# Patient Record
Sex: FEMALE | Race: OTHER | Employment: FULL TIME | URBAN - NONMETROPOLITAN AREA
[De-identification: names, ages, dates, MRNs, and addresses within clinical notes are randomized per-mention and may not be internally consistent; named-entity substitution may affect disease eponyms.]

---

## 2018-01-10 ENCOUNTER — OFFICE VISIT (OUTPATIENT)
Dept: PRIMARY CARE CLINIC | Age: 33
End: 2018-01-10
Payer: MEDICARE

## 2018-01-10 VITALS
BODY MASS INDEX: 27.49 KG/M2 | WEIGHT: 161 LBS | HEIGHT: 64 IN | TEMPERATURE: 102.5 F | HEART RATE: 104 BPM | DIASTOLIC BLOOD PRESSURE: 70 MMHG | SYSTOLIC BLOOD PRESSURE: 120 MMHG | OXYGEN SATURATION: 98 %

## 2018-01-10 DIAGNOSIS — J10.1 INFLUENZA A: Primary | ICD-10-CM

## 2018-01-10 DIAGNOSIS — R50.9 FEVER, UNSPECIFIED FEVER CAUSE: ICD-10-CM

## 2018-01-10 LAB
INFLUENZA A ANTIBODY: POSITIVE
INFLUENZA B ANTIBODY: NEGATIVE

## 2018-01-10 PROCEDURE — 99213 OFFICE O/P EST LOW 20 MIN: CPT | Performed by: NURSE PRACTITIONER

## 2018-01-10 PROCEDURE — 87804 INFLUENZA ASSAY W/OPTIC: CPT | Performed by: NURSE PRACTITIONER

## 2018-01-10 PROCEDURE — G8484 FLU IMMUNIZE NO ADMIN: HCPCS | Performed by: NURSE PRACTITIONER

## 2018-01-10 PROCEDURE — G8427 DOCREV CUR MEDS BY ELIG CLIN: HCPCS | Performed by: NURSE PRACTITIONER

## 2018-01-10 PROCEDURE — 1036F TOBACCO NON-USER: CPT | Performed by: NURSE PRACTITIONER

## 2018-01-10 PROCEDURE — G8419 CALC BMI OUT NRM PARAM NOF/U: HCPCS | Performed by: NURSE PRACTITIONER

## 2018-01-10 ASSESSMENT — ENCOUNTER SYMPTOMS
RHINORRHEA: 1
WHEEZING: 0
VOMITING: 0
SINUS PRESSURE: 0
ABDOMINAL PAIN: 0
SORE THROAT: 1
NAUSEA: 0
COUGH: 1
DIARRHEA: 0
SHORTNESS OF BREATH: 0

## 2018-01-10 NOTE — PROGRESS NOTES
[facility]             [address] [facility], [place], [address]                        Telephone [phone]             Fax [phone]     Aakash Carmichael  1985  MRN:  [id number]   Date of visit:  1/10/2018    Subjective:    Aakash Carmichael is a 34 y.o.  female who presents to [facility] today (1/10/2018) for evaluation of:    Chief Complaint   Patient presents with    Fever     x1 day, bilateral ear pain, decreased appetite, cough, sore throat, HA       Fever    This is a new problem. The current episode started today. The problem occurs intermittently. The problem has been unchanged. Her temperature was unmeasured prior to arrival. Associated symptoms include congestion, coughing, ear pain (bilateral), headaches, muscle aches and a sore throat. Pertinent negatives include no abdominal pain, diarrhea, nausea, rash, vomiting or wheezing. Treatments tried: nyquil. The treatment provided no relief. She has the following problem list:  There is no problem list on file for this patient. Current medications are:  No current outpatient prescriptions on file. No current facility-administered medications for this visit. She has No Known Allergies. .    She  reports that she has never smoked. She has never used smokeless tobacco.      Objective:    Vitals:    01/10/18 1749   BP: 120/70   Pulse: 104   Temp: 102.5 °F (39.2 °C)   SpO2: 98%     Body mass index is 27.64 kg/m². Review of Systems   Constitutional: Positive for appetite change, chills and fever. HENT: Positive for congestion, ear pain (bilateral), rhinorrhea and sore throat. Negative for sinus pressure. Respiratory: Positive for cough. Negative for shortness of breath and wheezing. Cardiovascular: Negative. Gastrointestinal: Negative for abdominal pain, diarrhea, nausea and vomiting. Skin: Negative for rash.    Neurological:

## 2018-01-10 NOTE — LETTER
[facility]  [address]iva  Phone: [phone]  Fax: [phone]    Liz Solomon NP        January 10, 2018     Patient: Gretchen Ocampo   YOB: 1985   Date of Visit: 1/10/2018       To Whom it May Concern:    Gretchen Ocampo was seen in my clinic on 1/10/2018. She may return to work on 01/16/2018. Diagnosed with influenza A. Contagious and unable to work around others at this time. .    If you have any questions or concerns, please don't hesitate to call.     Sincerely,         Liz Solomon NP

## 2018-01-10 NOTE — PATIENT INSTRUCTIONS
Patient Education        Influenza (Flu): Care Instructions  Your Care Instructions    Influenza (flu) is an infection in the lungs and breathing passages. It is caused by the influenza virus. There are different strains, or types, of the flu virus from year to year. Unlike the common cold, the flu comes on suddenly and the symptoms, such as a cough, congestion, fever, chills, fatigue, aches, and pains, are more severe. These symptoms may last up to 10 days. Although the flu can make you feel very sick, it usually doesn't cause serious health problems. Home treatment is usually all you need for flu symptoms. But your doctor may prescribe antiviral medicine to prevent other health problems, such as pneumonia, from developing. Older people and those who have a long-term health condition, such as lung disease, are most at risk for having pneumonia or other health problems. Follow-up care is a key part of your treatment and safety. Be sure to make and go to all appointments, and call your doctor if you are having problems. It's also a good idea to know your test results and keep a list of the medicines you take. How can you care for yourself at home? · Get plenty of rest.  · Drink plenty of fluids, enough so that your urine is light yellow or clear like water. If you have kidney, heart, or liver disease and have to limit fluids, talk with your doctor before you increase the amount of fluids you drink. · Take an over-the-counter pain medicine if needed, such as acetaminophen (Tylenol), ibuprofen (Advil, Motrin), or naproxen (Aleve), to relieve fever, headache, and muscle aches. Read and follow all instructions on the label. No one younger than 20 should take aspirin. It has been linked to Reye syndrome, a serious illness. · Do not smoke. Smoking can make the flu worse. If you need help quitting, talk to your doctor about stop-smoking programs and medicines.  These can increase your chances of quitting for good.  · Breathe moist air from a hot shower or from a sink filled with hot water to help clear a stuffy nose. · Before you use cough and cold medicines, check the label. These medicines may not be safe for young children or for people with certain health problems. · If the skin around your nose and lips becomes sore, put some petroleum jelly on the area. · To ease coughing:  ¨ Drink fluids to soothe a scratchy throat. ¨ Suck on cough drops or plain hard candy. ¨ Take an over-the-counter cough medicine that contains dextromethorphan to help you get some sleep. Read and follow all instructions on the label. ¨ Raise your head at night with an extra pillow. This may help you rest if coughing keeps you awake. · Take any prescribed medicine exactly as directed. Call your doctor if you think you are having a problem with your medicine. To avoid spreading the flu  · Wash your hands regularly, and keep your hands away from your face. · Stay home from school, work, and other public places until you are feeling better and your fever has been gone for at least 24 hours. The fever needs to have gone away on its own without the help of medicine. · Ask people living with you to talk to their doctors about preventing the flu. They may get antiviral medicine to keep from getting the flu from you. · To prevent the flu in the future, get a flu vaccine every fall. Encourage people living with you to get the vaccine. · Cover your mouth when you cough or sneeze. When should you call for help? Call 911 anytime you think you may need emergency care. For example, call if:  ? · You have severe trouble breathing. ?Call your doctor now or seek immediate medical care if:  ? · You have new or worse trouble breathing. ? · You seem to be getting much sicker. ? · You feel very sleepy or confused. ? · You have a new or higher fever. ? · You get a new rash. ? Watch closely for changes in your health, and be sure to contact

## 2018-01-15 ENCOUNTER — HOSPITAL ENCOUNTER (OUTPATIENT)
Age: 33
Discharge: HOME | End: 2018-01-15
Payer: MEDICARE

## 2018-01-15 ENCOUNTER — HOSPITAL ENCOUNTER (OUTPATIENT)
Dept: GENERAL RADIOLOGY | Age: 33
Discharge: HOME | End: 2018-01-15
Payer: MEDICARE

## 2018-01-15 ENCOUNTER — OFFICE VISIT (OUTPATIENT)
Dept: PRIMARY CARE CLINIC | Age: 33
End: 2018-01-15
Payer: MEDICARE

## 2018-01-15 VITALS
HEART RATE: 122 BPM | HEIGHT: 64 IN | SYSTOLIC BLOOD PRESSURE: 110 MMHG | TEMPERATURE: 100.9 F | RESPIRATION RATE: 16 BRPM | BODY MASS INDEX: 26.8 KG/M2 | OXYGEN SATURATION: 98 % | DIASTOLIC BLOOD PRESSURE: 70 MMHG | WEIGHT: 157 LBS

## 2018-01-15 DIAGNOSIS — R07.9 RIGHT-SIDED CHEST PAIN: ICD-10-CM

## 2018-01-15 DIAGNOSIS — J18.9 PNEUMONIA OF RIGHT UPPER LOBE DUE TO INFECTIOUS ORGANISM: Primary | ICD-10-CM

## 2018-01-15 PROCEDURE — 1036F TOBACCO NON-USER: CPT | Performed by: FAMILY MEDICINE

## 2018-01-15 PROCEDURE — G8427 DOCREV CUR MEDS BY ELIG CLIN: HCPCS | Performed by: FAMILY MEDICINE

## 2018-01-15 PROCEDURE — 99213 OFFICE O/P EST LOW 20 MIN: CPT | Performed by: FAMILY MEDICINE

## 2018-01-15 PROCEDURE — G8484 FLU IMMUNIZE NO ADMIN: HCPCS | Performed by: FAMILY MEDICINE

## 2018-01-15 PROCEDURE — 71046 X-RAY EXAM CHEST 2 VIEWS: CPT

## 2018-01-15 PROCEDURE — G8419 CALC BMI OUT NRM PARAM NOF/U: HCPCS | Performed by: FAMILY MEDICINE

## 2018-01-15 RX ORDER — LEVOFLOXACIN 500 MG/1
500 TABLET, FILM COATED ORAL DAILY
Qty: 7 TABLET | Refills: 0 | Status: SHIPPED | OUTPATIENT
Start: 2018-01-15 | End: 2018-01-22

## 2018-01-15 ASSESSMENT — ENCOUNTER SYMPTOMS
SORE THROAT: 0
SHORTNESS OF BREATH: 0
VOMITING: 1
COUGH: 1
DIARRHEA: 1
WHEEZING: 0

## 2018-01-15 NOTE — LETTER
[facility]  [address]  Phone: [phone]  Fax: [phone],         January 15, 2018     Patient: Tsering Rios   YOB: 1985   Date of Visit: 1/15/2018       To Whom it May Concern:    Tsering Rios was seen in my clinic on 1/15/2018. Please excuse her from work from 1/16/18 - 1/17/18. She may return to work on 1/18/18. If you have any questions or concerns, please don't hesitate to call.     Sincerely,         Glen Monge, DO

## 2018-01-16 NOTE — PROGRESS NOTES
01/15/18 1958   BP: 110/70   Pulse: 122   Resp: 16   Temp: 100.9 °F (38.3 °C)   SpO2: 98%   Weight: 157 lb (71.2 kg)   Height: 5' 4\" (1.626 m)     Physical Exam   Constitutional: She is oriented to person, place, and time. She appears well-developed and well-nourished. No distress. HENT:   Head: Normocephalic and atraumatic. Right Ear: Tympanic membrane, external ear and ear canal normal.   Left Ear: Tympanic membrane, external ear and ear canal normal.   Nose: Mucosal edema present. Right sinus exhibits no maxillary sinus tenderness and no frontal sinus tenderness. Left sinus exhibits no maxillary sinus tenderness and no frontal sinus tenderness. Mouth/Throat: Posterior oropharyngeal erythema (Mild) present. No oropharyngeal exudate. Clear post-nasal drainage noted   Eyes: Conjunctivae and EOM are normal. Pupils are equal, round, and reactive to light. Neck: Neck supple. Cardiovascular: Normal rate, regular rhythm and normal heart sounds. Pulmonary/Chest: Effort normal and breath sounds normal. No respiratory distress. She has no wheezes. She exhibits tenderness (R anterior and lateral chest). Frequent coughing noted. Abdominal: Soft. Bowel sounds are normal. She exhibits no distension. There is no tenderness. Lymphadenopathy:     She has no cervical adenopathy. Neurological: She is alert and oriented to person, place, and time. Psychiatric: She has a normal mood and affect. Assessment:      1. Pneumonia of right upper lobe due to infectious organism ([address]  levofloxacin (LEVAQUIN) 500 MG tablet   2. Right-sided chest pain  XR CHEST STANDARD (2 VW)       Plan:      Return if symptoms worsen or fail to improve in 2 days.     Orders Placed This Encounter   Procedures    XR CHEST STANDARD (2 VW)     Standing Status:   Future     Number of Occurrences:   1     Standing Expiration Date:   1/15/2019     Order Specific Question:   Reason for exam:     Answer:   R-sided pain with breathing, cough - diagnosed with influenza 5 days ago     Orders Placed This Encounter   Medications    levofloxacin (LEVAQUIN) 500 MG tablet     Sig: Take 1 tablet by mouth daily for 7 days     Dispense:  7 tablet     Refill:  0       Patient given educational materials - see patient instructions. Discussed use, benefit, and side effects of prescribed medications. All patient questions answered. Pt voiced understanding.        Electronically signed by General Conrado DO on 1/21/2018 at 11:46 PM

## 2021-10-21 ENCOUNTER — HOSPITAL ENCOUNTER (OUTPATIENT)
Age: 36
Setting detail: SPECIMEN
Discharge: HOME | End: 2021-10-21
Payer: MEDICARE

## 2021-10-21 ENCOUNTER — HOSPITAL ENCOUNTER (OUTPATIENT)
Dept: GENERAL RADIOLOGY | Age: 36
Discharge: HOME | End: 2021-10-23
Payer: MEDICARE

## 2021-10-21 ENCOUNTER — OFFICE VISIT (OUTPATIENT)
Dept: PRIMARY CARE CLINIC | Age: 36
End: 2021-10-21
Payer: MEDICARE

## 2021-10-21 VITALS
TEMPERATURE: 98.2 F | HEART RATE: 74 BPM | OXYGEN SATURATION: 98 % | DIASTOLIC BLOOD PRESSURE: 74 MMHG | BODY MASS INDEX: 29.35 KG/M2 | SYSTOLIC BLOOD PRESSURE: 124 MMHG | WEIGHT: 171 LBS

## 2021-10-21 DIAGNOSIS — R07.89 CHEST TIGHTNESS: ICD-10-CM

## 2021-10-21 DIAGNOSIS — R05.9 COUGH: ICD-10-CM

## 2021-10-21 DIAGNOSIS — R05.9 COUGH: Primary | ICD-10-CM

## 2021-10-21 DIAGNOSIS — Z20.822 CLOSE EXPOSURE TO COVID-19 VIRUS: ICD-10-CM

## 2021-10-21 PROCEDURE — U0005 INFEC AGEN DETEC AMPLI PROBE: HCPCS

## 2021-10-21 PROCEDURE — G8427 DOCREV CUR MEDS BY ELIG CLIN: HCPCS | Performed by: NURSE PRACTITIONER

## 2021-10-21 PROCEDURE — 71046 X-RAY EXAM CHEST 2 VIEWS: CPT

## 2021-10-21 PROCEDURE — G8484 FLU IMMUNIZE NO ADMIN: HCPCS | Performed by: NURSE PRACTITIONER

## 2021-10-21 PROCEDURE — 99213 OFFICE O/P EST LOW 20 MIN: CPT

## 2021-10-21 PROCEDURE — 1036F TOBACCO NON-USER: CPT | Performed by: NURSE PRACTITIONER

## 2021-10-21 PROCEDURE — 99213 OFFICE O/P EST LOW 20 MIN: CPT | Performed by: NURSE PRACTITIONER

## 2021-10-21 PROCEDURE — G8419 CALC BMI OUT NRM PARAM NOF/U: HCPCS | Performed by: NURSE PRACTITIONER

## 2021-10-21 PROCEDURE — U0003 INFECTIOUS AGENT DETECTION BY NUCLEIC ACID (DNA OR RNA); SEVERE ACUTE RESPIRATORY SYNDROME CORONAVIRUS 2 (SARS-COV-2) (CORONAVIRUS DISEASE [COVID-19]), AMPLIFIED PROBE TECHNIQUE, MAKING USE OF HIGH THROUGHPUT TECHNOLOGIES AS DESCRIBED BY CMS-2020-01-R: HCPCS

## 2021-10-21 ASSESSMENT — ENCOUNTER SYMPTOMS
DIARRHEA: 0
NAUSEA: 1
CHEST TIGHTNESS: 1
COUGH: 1
SORE THROAT: 1
RHINORRHEA: 1
VOMITING: 0
WHEEZING: 0
SHORTNESS OF BREATH: 0

## 2021-10-21 ASSESSMENT — PATIENT HEALTH QUESTIONNAIRE - PHQ9
SUM OF ALL RESPONSES TO PHQ QUESTIONS 1-9: 0
SUM OF ALL RESPONSES TO PHQ9 QUESTIONS 1 & 2: 0
2. FEELING DOWN, DEPRESSED OR HOPELESS: 0
SUM OF ALL RESPONSES TO PHQ QUESTIONS 1-9: 0
SUM OF ALL RESPONSES TO PHQ QUESTIONS 1-9: 0
1. LITTLE INTEREST OR PLEASURE IN DOING THINGS: 0

## 2021-10-21 NOTE — PROGRESS NOTES
[address] [place] [address]  [address] [facility], [id number]  [phone]      HPI:     URI   This is a new problem. The current episode started in the past 7 days. The problem has been unchanged. There has been no fever. Associated symptoms include congestion, coughing, headaches, nausea, rhinorrhea and a sore throat. Pertinent negatives include no chest pain, diarrhea, vomiting or wheezing. She has tried decongestant for the symptoms. The treatment provided mild relief. No current outpatient medications on file. No current facility-administered medications for this visit. No Known Allergies    All patients pastmedical, surgical, social and family history has been reviewed. Subjective:      Review of Systems   Constitutional: Positive for activity change, appetite change and fatigue. Negative for fever. HENT: Positive for congestion, postnasal drip, rhinorrhea and sore throat. Loss of taste and smell     Respiratory: Positive for cough and chest tightness. Negative for shortness of breath and wheezing. Cardiovascular: Negative for chest pain and palpitations. Gastrointestinal: Positive for nausea. Negative for diarrhea and vomiting. Neurological: Positive for headaches. Negative for dizziness and light-headedness. Objective:      Physical Exam  Vitals and nursing note reviewed. Constitutional:       Appearance: Normal appearance. She is ill-appearing. HENT:      Head: Normocephalic and atraumatic. Right Ear: Tympanic membrane, ear canal and external ear normal.      Left Ear: Tympanic membrane, ear canal and external ear normal.      Nose: Congestion present. Mouth/Throat:      Mouth: Mucous membranes are moist.      Pharynx: No posterior oropharyngeal erythema. Cardiovascular:      Rate and Rhythm: Normal rate and regular rhythm. Heart sounds: Normal heart sounds.    Pulmonary:      Effort: Pulmonary effort is normal.      Breath sounds: Normal breath sounds. Skin:     Capillary Refill: Capillary refill takes less than 2 seconds. Neurological:      General: No focal deficit present. Mental Status: She is alert and oriented to person, place, and time. XR CHEST (2 VW)  Narrative: EXAMINATION:  TWO XRAY VIEWS OF THE CHEST    10/21/2021 9:32 am    COMPARISON:  January 15, 2018 chest examination    HISTORY:  ORDERING SYSTEM PROVIDED HISTORY: Cough  TECHNOLOGIST PROVIDED HISTORY:  cough, chest tightness, close exposure to covid-19  Reason for Exam: Chest tightness; cough; COVID exposure; GP used    FINDINGS:  Stable normal cardiopericardial silhouette    Interval clearing of previously described right upper lobe infiltrate. There  are no significant pleural, parenchymal or mediastinal findings  Impression: No acute cardiopulmonary findings         Assessment:       Diagnosis Orders   1. Cough  XR CHEST STANDARD (2 VW)    COVID-19   2. Chest tightness  XR CHEST STANDARD (2 VW)    COVID-19   3. Close exposure to COVID-19 virus  XR CHEST STANDARD (2 VW)    COVID-19       Plan:      Reviewed chest xray with patient at [facility]  COVID-19 test ordered  Will call with results  Supportive care  Push fluids  Pt is to quarantine until results are back  Return if symptoms do not improve or worsen   Return PRN   No follow-ups on file. Orders Placed This Encounter   Procedures    XR CHEST STANDARD (2 VW)     Standing Status:   Future     Number of Occurrences:   1     Standing Expiration Date:   10/21/2022     Order Specific Question:   Reason for exam:     Answer:   cough, chest tightness, close exposure to covid-19    COVID-19     Standing Status:   Future     Standing Expiration Date:   10/21/2022     Scheduling Instructions:      1) Due to current limited availability of the COVID-19 test, tests will be prioritized based on responses to questions above. Testing may be delayed due to volume.             2) Print and instruct patient to adhere to CDC home isolation program. (Link Above)              3) Set up or refer patient for a monitoring program.              4) Have patient sign up for and leverage MyChart (if not previously done). Order Specific Question:   Is this test for diagnosis or screening? Answer:   Diagnosis of ill patient     Order Specific Question:   Symptomatic for COVID-19 as defined by CDC? Answer:   Yes     Order Specific Question:   Date of Symptom Onset     Answer:   10/18/2021     Order Specific Question:   Hospitalized for COVID-19? Answer:   No     Order Specific Question:   Admitted to ICU for COVID-19? Answer:   No     Order Specific Question:   Employed in healthcare setting? Answer:   No     Order Specific Question:   Resident in a congregate (group) care setting? Answer:   Unknown     Order Specific Question:   Pregnant? Answer:   Unknown     Order Specific Question:   Previously tested for COVID-19? Answer:   Unknown     No orders of the defined types were placed in this encounter. Patient given educational materials - see patient instructions. All patient questionsanswered. Pt voiced understanding. Reviewed health maintenance.      Electronically signed by Severo Pinna, APRN - CNP, CNP on 10/21/2021 at 10:47 AM

## 2021-10-22 ENCOUNTER — TELEPHONE (OUTPATIENT)
Dept: FAMILY MEDICINE CLINIC | Age: 36
End: 2021-10-22

## 2021-10-22 LAB
SARS-COV-2: ABNORMAL
SARS-COV-2: DETECTED
SOURCE: ABNORMAL

## 2021-10-22 NOTE — TELEPHONE ENCOUNTER
Message left for patient with results and recommendations.  Patient advised to contact our office if she have any questions or concernsl

## 2021-10-22 NOTE — TELEPHONE ENCOUNTER
----- Message from STEVE Lopez CNP sent at 10/22/2021  3:00 PM EDT -----  Pt is positive for COVID-19. Supportive care. Push fluids. She is to isolate for 10 days from symptom onset.

## 2022-01-05 ENCOUNTER — OFFICE VISIT (OUTPATIENT)
Dept: PRIMARY CARE CLINIC | Age: 37
End: 2022-01-05
Payer: MEDICARE

## 2022-01-05 ENCOUNTER — HOSPITAL ENCOUNTER (OUTPATIENT)
Dept: GENERAL RADIOLOGY | Age: 37
Discharge: HOME | End: 2022-01-07
Payer: MEDICARE

## 2022-01-05 VITALS
HEART RATE: 74 BPM | OXYGEN SATURATION: 97 % | BODY MASS INDEX: 29.87 KG/M2 | WEIGHT: 174 LBS | SYSTOLIC BLOOD PRESSURE: 110 MMHG | TEMPERATURE: 98.2 F | DIASTOLIC BLOOD PRESSURE: 74 MMHG

## 2022-01-05 DIAGNOSIS — S30.0XXA LUMBAR CONTUSION, INITIAL ENCOUNTER: ICD-10-CM

## 2022-01-05 DIAGNOSIS — S33.5XXA LUMBAR SPRAIN, INITIAL ENCOUNTER: ICD-10-CM

## 2022-01-05 DIAGNOSIS — S30.0XXA LUMBAR CONTUSION, INITIAL ENCOUNTER: Primary | ICD-10-CM

## 2022-01-05 PROCEDURE — 99212 OFFICE O/P EST SF 10 MIN: CPT | Performed by: FAMILY MEDICINE

## 2022-01-05 PROCEDURE — 99213 OFFICE O/P EST LOW 20 MIN: CPT | Performed by: FAMILY MEDICINE

## 2022-01-05 PROCEDURE — G8484 FLU IMMUNIZE NO ADMIN: HCPCS | Performed by: FAMILY MEDICINE

## 2022-01-05 PROCEDURE — 1036F TOBACCO NON-USER: CPT | Performed by: FAMILY MEDICINE

## 2022-01-05 PROCEDURE — G8419 CALC BMI OUT NRM PARAM NOF/U: HCPCS | Performed by: FAMILY MEDICINE

## 2022-01-05 PROCEDURE — 72100 X-RAY EXAM L-S SPINE 2/3 VWS: CPT

## 2022-01-05 PROCEDURE — G8427 DOCREV CUR MEDS BY ELIG CLIN: HCPCS | Performed by: FAMILY MEDICINE

## 2022-01-05 RX ORDER — CYCLOBENZAPRINE HCL 10 MG
10 TABLET ORAL 3 TIMES DAILY PRN
Qty: 30 TABLET | Refills: 0 | Status: SHIPPED | OUTPATIENT
Start: 2022-01-05 | End: 2022-01-15

## 2022-01-05 ASSESSMENT — ENCOUNTER SYMPTOMS
EYES NEGATIVE: 1
ALLERGIC/IMMUNOLOGIC NEGATIVE: 1
RESPIRATORY NEGATIVE: 1
BACK PAIN: 1
GASTROINTESTINAL NEGATIVE: 1

## 2022-01-05 ASSESSMENT — PATIENT HEALTH QUESTIONNAIRE - PHQ9
2. FEELING DOWN, DEPRESSED OR HOPELESS: 0
SUM OF ALL RESPONSES TO PHQ9 QUESTIONS 1 & 2: 0
SUM OF ALL RESPONSES TO PHQ QUESTIONS 1-9: 0
1. LITTLE INTEREST OR PLEASURE IN DOING THINGS: 0
SUM OF ALL RESPONSES TO PHQ QUESTIONS 1-9: 0

## 2022-01-05 NOTE — PROGRESS NOTES
Subjective:      Patient ID: Shruthi Aguirre is a 39 y.o. female. HPI  Acute urgent care visit for lower back pain after a fall on Sunday. She slipped in icy steps and feels the step edge hit her in the lower back. Pain problematic since then . Comfortable on the stomach lying down. Pain into the right gluteal area . Some tingling described into the left thigh. Not really comfortable sitting or standing. History reviewed. No pertinent past medical history. History reviewed. No pertinent surgical history. No current outpatient medications on file. No current facility-administered medications for this visit. No Known Allergies      Review of Systems   Constitutional: Negative. HENT: Negative. Eyes: Negative. Respiratory: Negative. Cardiovascular: Negative. Gastrointestinal: Negative. Endocrine: Negative. Genitourinary: Negative. Musculoskeletal: Positive for back pain. Skin: Negative. Allergic/Immunologic: Negative. Neurological: Negative. Hematological: Negative. Psychiatric/Behavioral: Negative. Objective:   Physical Exam  Constitutional:       General: She is not in acute distress. Appearance: She is well-developed. HENT:      Head: Normocephalic and atraumatic. Right Ear: External ear normal.      Left Ear: External ear normal.      Mouth/Throat:      Pharynx: No oropharyngeal exudate. Eyes:      General: No scleral icterus. Conjunctiva/sclera: Conjunctivae normal.   Neck:      Thyroid: No thyromegaly. Cardiovascular:      Rate and Rhythm: Normal rate and regular rhythm. Heart sounds: Normal heart sounds. No murmur heard. Pulmonary:      Effort: Pulmonary effort is normal. No respiratory distress. Breath sounds: Normal breath sounds. No wheezing. Abdominal:      General: Bowel sounds are normal. There is no distension. Palpations: Abdomen is soft. Tenderness: There is no abdominal tenderness.  There is no rebound. Musculoskeletal:      Cervical back: Normal and neck supple. Thoracic back: Normal.      Lumbar back: Tenderness and bony tenderness present. Decreased range of motion. Back:    Skin:     General: Skin is warm and dry. Findings: No erythema or rash. Neurological:      Mental Status: She is alert and oriented to person, place, and time. Psychiatric:         Behavior: Behavior normal.         Thought Content: Thought content normal.         Judgment: Judgment normal.       /74 (Site: Right Upper Arm, Position: Sitting, Cuff Size: Large Adult)   Pulse 74   Temp 98.2 °F (36.8 °C) (Tympanic)   Wt 174 lb (78.9 kg)   LMP 12/05/2021   SpO2 97%   BMI 29.87 kg/m²     Assessment:      Acute lower back pain  Sprain described with acute /sharp pain with movement   Rule out compression vs other fracture given mechanism of injury      Plan:      Cont. Ibuprofen 600 mg every 8 hrs with food  Flexeril 10 mg tid prn spasm, cautioned drowsiness. Xray of lumbar spine.           Lyn Bailon MD

## 2022-08-25 ENCOUNTER — OFFICE VISIT (OUTPATIENT)
Dept: PRIMARY CARE CLINIC | Age: 37
End: 2022-08-25
Payer: MEDICARE

## 2022-08-25 ENCOUNTER — HOSPITAL ENCOUNTER (OUTPATIENT)
Age: 37
Setting detail: SPECIMEN
Discharge: HOME | End: 2022-08-25
Payer: MEDICARE

## 2022-08-25 VITALS
RESPIRATION RATE: 15 BRPM | OXYGEN SATURATION: 99 % | DIASTOLIC BLOOD PRESSURE: 82 MMHG | TEMPERATURE: 97.3 F | HEIGHT: 64 IN | HEART RATE: 94 BPM | BODY MASS INDEX: 29.05 KG/M2 | WEIGHT: 170.13 LBS | SYSTOLIC BLOOD PRESSURE: 124 MMHG

## 2022-08-25 DIAGNOSIS — H92.03 OTALGIA OF BOTH EARS: ICD-10-CM

## 2022-08-25 DIAGNOSIS — J06.9 VIRAL UPPER RESPIRATORY ILLNESS: Primary | ICD-10-CM

## 2022-08-25 DIAGNOSIS — R05.9 COUGH: ICD-10-CM

## 2022-08-25 DIAGNOSIS — J06.9 VIRAL UPPER RESPIRATORY ILLNESS: ICD-10-CM

## 2022-08-25 DIAGNOSIS — J02.9 SORE THROAT: ICD-10-CM

## 2022-08-25 DIAGNOSIS — J02.9 ACUTE VIRAL PHARYNGITIS: ICD-10-CM

## 2022-08-25 DIAGNOSIS — Z20.822 PERSON UNDER INVESTIGATION FOR COVID-19: ICD-10-CM

## 2022-08-25 DIAGNOSIS — R09.82 POST-NASAL DRIP: ICD-10-CM

## 2022-08-25 LAB — S PYO AG THROAT QL: NORMAL

## 2022-08-25 PROCEDURE — PBSHW POCT RAPID STREP A: Performed by: NURSE PRACTITIONER

## 2022-08-25 PROCEDURE — G8419 CALC BMI OUT NRM PARAM NOF/U: HCPCS | Performed by: NURSE PRACTITIONER

## 2022-08-25 PROCEDURE — 1036F TOBACCO NON-USER: CPT | Performed by: NURSE PRACTITIONER

## 2022-08-25 PROCEDURE — 99212 OFFICE O/P EST SF 10 MIN: CPT | Performed by: NURSE PRACTITIONER

## 2022-08-25 PROCEDURE — 99213 OFFICE O/P EST LOW 20 MIN: CPT | Performed by: NURSE PRACTITIONER

## 2022-08-25 PROCEDURE — U0005 INFEC AGEN DETEC AMPLI PROBE: HCPCS

## 2022-08-25 PROCEDURE — G8427 DOCREV CUR MEDS BY ELIG CLIN: HCPCS | Performed by: NURSE PRACTITIONER

## 2022-08-25 PROCEDURE — U0003 INFECTIOUS AGENT DETECTION BY NUCLEIC ACID (DNA OR RNA); SEVERE ACUTE RESPIRATORY SYNDROME CORONAVIRUS 2 (SARS-COV-2) (CORONAVIRUS DISEASE [COVID-19]), AMPLIFIED PROBE TECHNIQUE, MAKING USE OF HIGH THROUGHPUT TECHNOLOGIES AS DESCRIBED BY CMS-2020-01-R: HCPCS

## 2022-08-25 PROCEDURE — 87880 STREP A ASSAY W/OPTIC: CPT | Performed by: NURSE PRACTITIONER

## 2022-08-25 ASSESSMENT — ENCOUNTER SYMPTOMS
NAUSEA: 0
VOMITING: 0
RHINORRHEA: 0
CONSTIPATION: 0
SORE THROAT: 1
ALLERGIC/IMMUNOLOGIC NEGATIVE: 1
DIARRHEA: 0
COUGH: 1
EYES NEGATIVE: 1
ABDOMINAL PAIN: 0

## 2022-08-25 NOTE — PROGRESS NOTES
[address]  Urgent Care A department of [facility]  [facility]  Phone: [phone]  Fax: [phone]      Fuad Mckeon is a 39 y.o. female who presents to the [facility] today for her medical conditions/complaints as noted below. Fuad Mckeon is c/o of Pharyngitis (Sore throat,earache started Tuesday )          HPI:     Pharyngitis  This is a new problem. The current episode started in the past 7 days (8/23/2022). The problem occurs constantly. Associated symptoms include coughing (rare but present) and a sore throat. Pertinent negatives include no abdominal pain, arthralgias, chest pain, chills, congestion, fatigue, fever, myalgias, nausea, rash or vomiting. Associated symptoms comments: Ears hurt when swallowing. . The symptoms are aggravated by drinking, eating and swallowing. She has tried NSAIDs and acetaminophen (allergy pill also.) for the symptoms. The treatment provided no relief. History reviewed. No pertinent past medical history. No Known Allergies    Wt Readings from Last 3 Encounters:   08/25/22 170 lb 2 oz (77.2 kg)   01/05/22 174 lb (78.9 kg)   10/21/21 171 lb (77.6 kg)     BP Readings from Last 3 Encounters:   08/25/22 124/82   01/05/22 110/74   10/21/21 124/74      Temp Readings from Last 3 Encounters:   08/25/22 97.3 °F (36.3 °C) (Tympanic)   01/05/22 98.2 °F (36.8 °C) (Tympanic)   10/21/21 98.2 °F (36.8 °C) (Tympanic)     Pulse Readings from Last 3 Encounters:   08/25/22 94   01/05/22 74   10/21/21 74     SpO2 Readings from Last 3 Encounters:   08/25/22 99%   01/05/22 97%   10/21/21 98%       Subjective:      Review of Systems   Constitutional:  Positive for appetite change (pain with swallowing. ). Negative for activity change, chills, fatigue and fever. HENT:  Positive for ear pain (with swallowing), hearing loss (bilateral hearing aids.) and sore throat. Negative for congestion, postnasal drip and rhinorrhea. Eyes: Negative. Respiratory:  Positive for cough (rare but present). Cardiovascular:  Negative for chest pain. Gastrointestinal:  Negative for abdominal pain, constipation, diarrhea, nausea and vomiting. Endocrine: Negative. Genitourinary: Negative. Negative for difficulty urinating and dysuria. Musculoskeletal:  Negative for arthralgias and myalgias. Skin: Negative. Negative for rash. Allergic/Immunologic: Negative. Neurological: Negative. Hematological: Negative. Psychiatric/Behavioral: Negative. All other systems reviewed and are negative. Objective:     Vitals:    08/25/22 1347   BP: 124/82   Site: Left Upper Arm   Position: Sitting   Cuff Size: Medium Adult   Pulse: 94   Resp: 15   Temp: 97.3 °F (36.3 °C)   TempSrc: Tympanic   SpO2: 99%   Weight: 170 lb 2 oz (77.2 kg)   Height: 5' 4\" (1.626 m)     Body mass index is 29.2 kg/m². /82 (Site: Left Upper Arm, Position: Sitting, Cuff Size: Medium Adult)   Pulse 94   Temp 97.3 °F (36.3 °C) (Tympanic)   Resp 15   Ht 5' 4\" (1.626 m)   Wt 170 lb 2 oz (77.2 kg)   LMP 07/25/2022   SpO2 99%   BMI 29.20 kg/m²   Physical Exam  Vitals and nursing note reviewed. Constitutional:       General: She is not in acute distress. Appearance: She is ill-appearing. HENT:      Right Ear: Hearing, tympanic membrane, ear canal and external ear normal. There is no impacted cerumen. Left Ear: Hearing, tympanic membrane, ear canal and external ear normal. There is no impacted cerumen. Nose: Mucosal edema, congestion and rhinorrhea present. Right Turbinates: Swollen. Left Turbinates: Swollen. Mouth/Throat:      Lips: Pink. Mouth: Mucous membranes are moist.      Pharynx: Uvula midline. Posterior oropharyngeal erythema present. Tonsils: No tonsillar abscesses. Comments: Moderate amount of mucus noted in the pharynx.   Eyes:      Conjunctiva/sclera: Conjunctivae normal.      Pupils: Pupils are request a nurse visit where we check you pulse oximetry/oxygen level. If your level is too low you will be sent to the hospital for further treatment. You are being provided a work or school excuse and are encouraged to quarantine until you test results are back. If you are COVID positive you will be given an additional excuse to lengthen your quarantine. Current guidelines state that you should be fever free for at least 24 hours without the use of Tylenol or ibuprofen and have significant symptom improvement 5 days from start of your symptoms. Recommended over the counter medications/treatments: The use of an antihistamine (Claritin or Zyrtec) and a nasal steroid spray (Flonase or Nasacort) may help with sinus congestion and drainage. Mucinex will also help thin secretions and improve congestion. Honey with or without Lemon may also help with coughing. Probiotics daily may help boost immune system. If you are allowed to take tylenol or ibuprofen you may take these to relieve fever, chills or body aches. Make sure to stay well hydrated by drinking plenty of water. Follow up with primary care provider in 1 to 2 days or as needed if no improvement or worsening of your symptoms. Discussed exam, POCT findings, plan of care, and follow-up at length with patient. Reviewed all prescribed and recommended medications, administration and side effects. Encouraged patient to follow up with PCP or return to the clinic for no improvement and or worsening of symptoms. Patient instructed to go to ER or call 911 if any difficulty breathing, shortness of breath, inability to swallow, hives or temp greater than 103 degrees. All questions were answered and they verbalized understanding and were agreeable with the plan. Return if symptoms worsen or fail to improve.         Electronically signed by STEVE Cho CNP on 8/25/2022 at 2:56 PM

## 2022-08-25 NOTE — LETTER
[address] [facility] A department of [facility]  Phone: [phone]  Fax: [phone]        Anju Holden  YOB: 1985        To Whom it May Concern:      Anju Holden was seen in my clinic on 8/25/2022 and had COVID testing performed. They may return to work/school after a negative covid test result (results expected within 1-3 days). If their test is positive they will need to quarantine based on current CDC Guidelines for a total of five days from the onset of symptoms. They must be fever free for at least 24 hours without the use of medications prior to returning to work/school. If you have any questions or concerns, please don't hesitate to call.     Sincerely,       STEVE Billingsley - CNP

## 2022-08-25 NOTE — PATIENT INSTRUCTIONS
Recommended over the counter medications/treatments: The use of an antihistamine (Claritin or Zyrtec) and a nasal steroid spray (Flonase or Nasacort) may help with sinus congestion and drainage. Mucinex will also help thin secretions and improve congestion. Works best if drinking plenty of water. Honey with or without Lemon may also help with coughing. Probiotics daily may help boost immune system. Alternating hot liquids with cold liquids helps to sooth sore throat  Use Acetaminophen (Tylenol) to help relieve fever, chills or body aches. If allowed, you may alternate using ibuprofen (Motrin) and Tylenol. Do not exceed 3,000mg of Tylenol in a 24 hour time period. Make sure to stay well hydrated by drinking plenty of water. Follow up with primary care provider in 1 to 2 days or as needed if no improvement or worsening of your symptoms.

## 2022-08-26 LAB
SARS-COV-2: ABNORMAL
SARS-COV-2: DETECTED
SOURCE: ABNORMAL